# Patient Record
Sex: FEMALE | Race: WHITE | NOT HISPANIC OR LATINO | Employment: PART TIME | RURAL
[De-identification: names, ages, dates, MRNs, and addresses within clinical notes are randomized per-mention and may not be internally consistent; named-entity substitution may affect disease eponyms.]

---

## 2020-07-28 ENCOUNTER — OFFICE VISIT (OUTPATIENT)
Dept: FAMILY MEDICINE CLINIC | Facility: CLINIC | Age: 20
End: 2020-07-28

## 2020-07-28 VITALS
OXYGEN SATURATION: 98 % | DIASTOLIC BLOOD PRESSURE: 72 MMHG | TEMPERATURE: 98.5 F | BODY MASS INDEX: 28.52 KG/M2 | RESPIRATION RATE: 16 BRPM | WEIGHT: 171.2 LBS | HEART RATE: 104 BPM | HEIGHT: 65 IN | SYSTOLIC BLOOD PRESSURE: 118 MMHG

## 2020-07-28 DIAGNOSIS — F41.9 ANXIETY: ICD-10-CM

## 2020-07-28 DIAGNOSIS — Z76.89 ENCOUNTER TO ESTABLISH CARE: ICD-10-CM

## 2020-07-28 DIAGNOSIS — F32.A DEPRESSION, UNSPECIFIED DEPRESSION TYPE: ICD-10-CM

## 2020-07-28 PROBLEM — Z00.00 WELLNESS EXAMINATION: Status: ACTIVE | Noted: 2020-07-28

## 2020-07-28 PROCEDURE — 99203 OFFICE O/P NEW LOW 30 MIN: CPT | Performed by: FAMILY MEDICINE

## 2020-07-28 RX ORDER — SERTRALINE HYDROCHLORIDE 100 MG/1
100 TABLET, FILM COATED ORAL DAILY
COMMUNITY
End: 2020-07-28 | Stop reason: SDUPTHER

## 2020-07-28 RX ORDER — SERTRALINE HYDROCHLORIDE 100 MG/1
100 TABLET, FILM COATED ORAL DAILY
Qty: 30 TABLET | Refills: 3 | Status: SHIPPED | OUTPATIENT
Start: 2020-07-28 | End: 2020-09-28 | Stop reason: SDUPTHER

## 2020-07-28 RX ORDER — FEXOFENADINE HCL 180 MG/1
180 TABLET ORAL DAILY
COMMUNITY

## 2020-07-28 NOTE — PROGRESS NOTES
Subjective   Luci Cabral is a 19 y.o. female presenting with chief complaint of:   Chief Complaint   Patient presents with   • Establish Care       History of Present Illness :  Alone.  Here for primarily to establish care..       Has  few chronic problems to consider that might have a bearing on today's issues;  an interval appointment.       Chronic/acute problems reviewed today:   1. Anxiety chronic/variable.  Goes back several years; into her childhood.  Early on had some issues of depression enough to be suicidal without injury.  Has done well with Zoloft; having used other medicines in the past.  Moved to [facility] and has not been able to find a physician psychiatrist or even a counselor.  Getting ready to run out of her prescription and needs help.  Social stressors are usual and not extreme.  She moved here to be closer to her friend.  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   2. Depression, unspecified depression type see anxiety.  Denies suicidal ideations.   3. Encounter to establish care first visit.     Has an/another acute issue today: none.    The following portions of the patient's history were reviewed and updated as appropriate: allergies, current medications, past family history, past medical history, past social history, past surgical history and problem list.      Current Outpatient Medications:   •  fexofenadine (ALLEGRA) 180 MG tablet, Take 180 mg by mouth Daily., Disp: , Rfl:   •  sertraline (ZOLOFT) 100 MG tablet, Take 1 tablet by mouth Daily., Disp: 30 tablet, Rfl: 3    No problems with medications.  Refills if needed done    Allergies   Allergen Reactions   • Penicillins Unknown - High Severity   • Amoxicillin Unknown - High Severity   • Omnicef [Cefdinir] Unknown - High Severity       Review of Systems  GENERAL:  Active home/work. Sleep is ok. No fever now/recent.  SKIN: No rash/skin lesion of concern other than that above  ENDO:  No syncope, near or diaphoretic  "sweaty spells.  HEENT: No recent head injury; or problem with headache.  No vision change.  No significant hearing loss.  Ears without pain/drainage.  No sore throat.  No significant nasal/sinus congestion/drainage. No epistaxis.  CHEST: No chest wall tenderness or mass. No significant cough, without wheeze.  No SOB; no hemoptysis.  CV: No exertional chest pain, palpitations, ankle edema.  GI: No dysphagia or heartburn.  No abdominal pain, diarrhea, constipation.  No rectal bleeding, or melena.    :  Voids without dysuria; incontinence to completion.  GYN: Menses normal.   ORTHO: No painful/swollen joints but various on /off sore.  No sore neck or back.  No acute neck or back pain without recent injury.  NEURO: No focal/significant weakness of extremities. No dizziness.   No numbness/paresthesias.   PSYCH: No memory loss.  Mood /variable; occ anxious, depressed but/and not suicidal.  Tries to tolerate stress .   Screening:  Mammogram: NA  Bone density: NA  Low dose CT chest: NA  GI: NA  Prostate: NA  Usual lab order; NA    Lab Results:  No results found for this or any previous visit.    A1C:No results for input(s): HGBA1C in the last 33301 hours.  PSA:No results for input(s): PSA in the last 74970 hours.  CBC:No results for input(s): WBC, HGB, HCT, PLT, IRONSERUM, IRON in the last 44492 hours.   BMP/CMP:No results for input(s): NA, K, CL, CO2, GLU, BUN, CREATININE, EGFRIFNONA, EGFRIFAFRI, CALCIUM in the last 78032 hours.  HEPATIC:No results for input(s): ALT, AST, ALKPHOS, TOTAL in the last 61467 hours.    Invalid input(s): HEPATITSC  THYROID:No results for input(s): TSH, T3FREE, FTI in the last 97953 hours.    Invalid input(s): T4FREE, T3, T4, TEUP,  TOTALT4    Objective   /72   Pulse 104   Temp 98.5 °F (36.9 °C)   Resp 16   Ht 165.1 cm (65\")   Wt 77.7 kg (171 lb 3.2 oz)   SpO2 98%   Breastfeeding No   BMI 28.49 kg/m²   Body mass index is 28.49 kg/m².    Recent Vitals       7/28/2020             " BP:  118/72    Pulse:  104    Temp:  98.5 °F (36.9 °C)    Weight:  77.7 kg (171 lb 3.2 oz)    Percentile: 92 %, Z= 1.41*    BMI (Calculated):  28.5    *Growth percentiles are based on [facility] (Girls, 2-20 Years) data          Physical Exam  GENERAL:  Well nourished/developed in no acute distress.   SKIN: Turgor excellent, without wound, rash, lesion.  HEENT: Normal cephalic without trauma.  Pupils equal round reactive to light. Extraocular motions full without nystagmus.   External canals nonobstructive nontender without reddness. Tymphatic membranes catherine with lior structures intact.   Oral cavity without growths, exudates, and moist.  Posterior pharynx without mass, obstruction, redness.  No thyromegaly, mass, tenderness, lymphadenopathy and supple.  CV: Regular rhythm.  No murmur, gallop,  edema. Posterior pulses intact.  No carotid bruits.  CHEST: No chest wall tenderness or mass.   LUNGS: Symmetric motion with clear to auscultation.  No dullness to percussion  ABD: Soft, nontender without mass.   ORTHO: Symmetric extremities without swelling/point tenderness.  Full gross range of motion.  .  Wheelchair .  Walking with assistance .  NEURO: CN 2-12 grossly intact.  Symmetric facies and UE/LE. 3/5 strength throughout. 1/4 x UE/LE  equal reflexes.  Nonfocal use extremities. Speech clear.  Intact light touch with monofilament, vibratory sensation with tuning fork; equal toes/distal feet.    PSYCH: Oriented x 3.  Pleasant calm, well kept.  Purposeful/directed conservation with intact short/long gross memory.     Assessment/Plan     1. Anxiety    2. Depression, unspecified depression type    3. Encounter to establish care        Discussions:   Try to help get her a counselor    Rx: reviewed/changes:  New Medications Ordered This Visit   Medications   • sertraline (ZOLOFT) 100 MG tablet     Sig: Take 1 tablet by mouth Daily.     Dispense:  30 tablet     Refill:  3       LAB/Testing/Referrals: reviewed/orders:   Today:    Orders Placed This Encounter   Procedures   • Ambulatory Referral to Behavioral Health     Chronic/recurrent labs above or change to:   later     Body mass index is 28.49 kg/m².  Patient's Body mass index is 28.49 kg/m². BMI is within normal parameters. No follow-up required..    Tobacco use reviewed:    Non-smoker  Luci Cabral  reports that she has never smoked. She has never used smokeless tobacco.     There are no Patient Instructions on file for this visit.    Follow up: Return for Dr Tenorio-, 3-6 m;.  Future Appointments   Date Time Provider Department Center   12/1/2020  1:30 PM Jason Tenorio MD [facility] None

## 2020-09-28 ENCOUNTER — TELEPHONE (OUTPATIENT)
Dept: FAMILY MEDICINE CLINIC | Facility: CLINIC | Age: 20
End: 2020-09-28

## 2020-09-28 DIAGNOSIS — F41.9 ANXIETY: ICD-10-CM

## 2020-09-28 DIAGNOSIS — F32.A DEPRESSION, UNSPECIFIED DEPRESSION TYPE: ICD-10-CM

## 2020-09-28 RX ORDER — SERTRALINE HYDROCHLORIDE 100 MG/1
100 TABLET, FILM COATED ORAL DAILY
Qty: 30 TABLET | Refills: 3 | Status: SHIPPED | OUTPATIENT
Start: 2020-09-28 | End: 2020-10-01 | Stop reason: SDUPTHER

## 2020-09-28 NOTE — TELEPHONE ENCOUNTER
Caller: Luci Cabral    Relationship: Self    Best call back number: [phone]    What medication are you requesting:sertraline (ZOLOFT) 150 MG tablet    What are your current symptoms: ANXIETY AND DEPRESSION      Have you had these symptoms before:    [x] Yes  [] No    Have you been treated for these symptoms before:   [x] Yes  [] No    If a prescription is needed, what is your preferred pharmacy and phone number: [address] [facility] [phone] PH - [phone]      Additional notes:    PATIENT STATES THAT SHE WAS NOT HAPPY WITH THE OUTCOME OF TAKING  MG DOSAGE SO INCREASED TO TAKING 150 MG DAILY AND IS FEELING MUCH BETTER AND WOULD LIKE TO HAVE AN INCREASE IN MEDICATION SENT TO PHARMACY

## 2020-09-28 NOTE — TELEPHONE ENCOUNTER
I called pt to let her know she will need an appt if she wants it increased to 150mg. I went ahead and sent in a refill of the 100mg.

## 2020-10-01 ENCOUNTER — OFFICE VISIT (OUTPATIENT)
Dept: FAMILY MEDICINE CLINIC | Facility: CLINIC | Age: 20
End: 2020-10-01

## 2020-10-01 VITALS
BODY MASS INDEX: 28.66 KG/M2 | OXYGEN SATURATION: 100 % | HEART RATE: 74 BPM | RESPIRATION RATE: 16 BRPM | TEMPERATURE: 96.9 F | WEIGHT: 172 LBS | SYSTOLIC BLOOD PRESSURE: 118 MMHG | DIASTOLIC BLOOD PRESSURE: 76 MMHG | HEIGHT: 65 IN

## 2020-10-01 DIAGNOSIS — F32.A DEPRESSION, UNSPECIFIED DEPRESSION TYPE: ICD-10-CM

## 2020-10-01 DIAGNOSIS — F41.9 ANXIETY: ICD-10-CM

## 2020-10-01 PROCEDURE — 99213 OFFICE O/P EST LOW 20 MIN: CPT | Performed by: FAMILY MEDICINE

## 2020-10-01 RX ORDER — SERTRALINE HYDROCHLORIDE 100 MG/1
150 TABLET, FILM COATED ORAL DAILY
Qty: 45 TABLET | Refills: 3 | Status: SHIPPED | OUTPATIENT
Start: 2020-10-01 | End: 2021-03-10 | Stop reason: SDUPTHER

## 2020-10-01 NOTE — PROGRESS NOTES
Subjective   Luci Cabral is a 19 y.o. female presenting with chief complaint of:   Chief Complaint   Patient presents with   • Medication Problem     requests increase of zoloft       History of Present Illness :  Alone.  Here for primarily an acute issue today; can she increase her Zoloft.  Recently started Zoloft; medicine that she is used in the past.  She definitely can find that the 100 mg has made her less anxious depressed; but she thinks she could tolerate 150 as she has no side effects.  No significant stressors but many minor stressors keep her easily down or anxious.  No suicidal ideation or intent.       Has multiple chronic problems to consider that might have a bearing on today's issues; somewhat an interval appointment.       Chronic/acute problems reviewed today:   1. Depression, unspecified depression type chronic currently stable/not significant  mood swings, down moods, nervousness, difficulty with concentration to function home/work.  Others close have not been concerned.  No suicide ideation/intent.  Rx helps      2. Anxiety Chronic/variable; but improving.   On/off anxiety tolerated somewhat.  Rx helps and interested in Rx change. Stress ongoing stable.      3       Vaccine need/counseling.     Has an/another acute issue today: none.    The following portions of the patient's history were reviewed and updated as appropriate: allergies, current medications, past family history, past medical history, past social history, past surgical history and problem list.      Current Outpatient Medications:   •  fexofenadine (ALLEGRA) 180 MG tablet, Take 180 mg by mouth Daily., Disp: , Rfl:   •  sertraline (ZOLOFT) 100 MG tablet, Take 1 tablet by mouth Daily., Disp: 30 tablet, Rfl: 3    No problems with medications.  Refills if needed done    Allergies   Allergen Reactions   • Penicillins Unknown - High Severity   • Amoxicillin Unknown - High Severity   • Omnicef [Cefdinir] Unknown - High Severity  "      Review of Systems  GENERAL:  Active home/work. Sleep is ok. No fever now/recent.  ENDO:  No syncope, near or diaphoretic sweaty spells.  HEENT: No recent head injury; or problem with headache.   CHEST: No chest wall tenderness or mass. No significant cough, without wheeze.  No SOB; no hemoptysis.  CV: No exertional chest pain, palpitations, ankle edema.  GI: No dysphagia or heartburn.  No abdominal pain, diarrhea, constipation.    :  Voids without dysuria; incontinence to completion.  GYN: Menses normal.   ORTHO: No painful/swollen joints but various on /off sore.  No sore neck or back.  No acute neck or back pain without recent injury.  NEURO: No focal/significant weakness of extremities. No dizziness.   No numbness/paresthesias.   PSYCH: No memory loss.  Mood /variable; occ anxious, depressed but/and not suicidal.  Tries to tolerate stress .   Screening:  Mammogram: NA  Bone density: NA  Low dose CT chest: NA  GI: NA  Prostate: NA  Usual lab order; NA      Lab Results:  None    Objective   /76   Pulse 74   Temp 96.9 °F (36.1 °C)   Resp 16   Ht 165.1 cm (65\")   Wt 78 kg (172 lb)   SpO2 100%   BMI 28.62 kg/m²   Body mass index is 28.62 kg/m².    Recent Vitals       7/28/2020 10/1/2020          BP:  118/72  118/76      Pulse:  104  74      Temp:  98.5 °F (36.9 °C)  96.9 °F (36.1 °C)      Weight:  77.7 kg (171 lb 3.2 oz)  78 kg (172 lb)      Percentile: 92 %, Z= 1.41* 92 %, Z= 1.42*      BMI (Calculated):  28.5  28.6      *Growth percentiles are based on CDC (Girls, 2-20 Years) data          Physical Exam  GENERAL:  Well nourished/developed in no acute distress.   HEENT: Normal cephalic without trauma.  Pupils equal round reactive to light. Extraocular motions full without nystagmus.    No thyromegaly, mass, tenderness, lymphadenopathy and supple.  CV: Regular rhythm.  No murmur, gallop,  edema.  CHEST: No chest wall tenderness or mass.   LUNGS: Symmetric motion with clear to auscultation.    ABD: " Soft, nontender without mass.   ORTHO: Symmetric extremities without swelling/point tenderness.  Full gross range of motion.  .  Wheelchair .  Walking with assistance .  NEURO: CN 2-12 grossly intact.  Symmetric facies and UE/LE. 3/5 strength throughout. 1/4 x UE/LE  equal reflexes.  Nonfocal use extremities. Speech clear.  Intact light touch with monofilament, vibratory sensation with tuning fork; equal toes/distal feet.    PSYCH: Oriented x 3.  Pleasant calm, well kept.  Purposeful/directed conservation with intact short/long gross memory.     Assessment/Plan     1. Depression, unspecified depression type    2. Anxiety      Discussions:   Flu mist; an option but carries risk for a live virus of spread  No contraindications for regular flu shot  Increase zoloft; report if problems  Labs would be nice; yearly    Rx: reviewed/changes:  New Medications Ordered This Visit   Medications   • sertraline (ZOLOFT) 100 MG tablet     Sig: Take 1.5 tablets by mouth Daily.     Dispense:  45 tablet     Refill:  3     LAB/Testing/Referrals: reviewed/orders:   Today:   No orders of the defined types were placed in this encounter.    Chronic/recurrent labs above or change to:   same     Body mass index is 28.62 kg/m².  Patient's Body mass index is 28.62 kg/m². BMI is within normal parameters. No follow-up required..      Tobacco use reviewed:    Luci Cabral  reports that she has never smoked. She has never used smokeless tobacco..  There are no Patient Instructions on file for this visit.    Follow up: Return for move 12.1.20 back 6m.  Future Appointments   Date Time Provider Department Center   3/25/2021 10:45 AM Jason Tenorio MD [facility] None       Procedures none

## 2020-12-29 ENCOUNTER — OFFICE VISIT (OUTPATIENT)
Dept: FAMILY MEDICINE CLINIC | Facility: CLINIC | Age: 20
End: 2020-12-29

## 2020-12-29 VITALS
TEMPERATURE: 98.4 F | HEART RATE: 92 BPM | BODY MASS INDEX: 28.82 KG/M2 | WEIGHT: 173 LBS | DIASTOLIC BLOOD PRESSURE: 74 MMHG | SYSTOLIC BLOOD PRESSURE: 116 MMHG | HEIGHT: 65 IN | OXYGEN SATURATION: 99 %

## 2020-12-29 DIAGNOSIS — J45.20 MILD INTERMITTENT ASTHMA WITHOUT COMPLICATION: Primary | ICD-10-CM

## 2020-12-29 PROCEDURE — 99213 OFFICE O/P EST LOW 20 MIN: CPT | Performed by: NURSE PRACTITIONER

## 2020-12-29 RX ORDER — ALBUTEROL SULFATE 90 UG/1
2 AEROSOL, METERED RESPIRATORY (INHALATION) EVERY 4 HOURS PRN
Qty: 18 G | Refills: 2 | Status: SHIPPED | OUTPATIENT
Start: 2020-12-29 | End: 2021-03-25

## 2020-12-29 NOTE — PROGRESS NOTES
Subjective   Chief Complaint:  Asthma    History of Present Illness:  This 20 y.o. female was seen in the office today.  She is evaluated for asthma.  She reports she had childhood asthma.  She has gotten back into the gym and is having some exercise-induced episodes.  Denies any nighttime awakenings.  Denies any chest pains with exercise.  Reports having some airway tightness.    Allergies   Allergen Reactions   • Penicillins Unknown - High Severity   • Amoxicillin Unknown - High Severity   • Omnicef [Cefdinir] Unknown - High Severity      Current Outpatient Medications on File Prior to Visit   Medication Sig   • fexofenadine (ALLEGRA) 180 MG tablet Take 180 mg by mouth Daily.   • sertraline (ZOLOFT) 100 MG tablet Take 1.5 tablets by mouth Daily.     No current facility-administered medications on file prior to visit.       Past Medical, Surgical, Social, and Family History:  Past Medical History:   Diagnosis Date   • Depression      Past Surgical History:   Procedure Laterality Date   • APPENDECTOMY       Social History     Socioeconomic History   • Marital status: Single     Spouse name: Not on file   • Number of children: Not on file   • Years of education: Not on file   • Highest education level: Not on file   Tobacco Use   • Smoking status: Never Smoker   • Smokeless tobacco: Never Used   Substance and Sexual Activity   • Alcohol use: Never     Frequency: Never   • Drug use: Never   • Sexual activity: Defer     Family History   Problem Relation Age of Onset   • Crohn's disease Mother      Review of Systems   Constitutional: Negative for appetite change, chills and fever.   Respiratory: Negative for cough and shortness of breath.    Cardiovascular: Negative for chest pain and palpitations.   Musculoskeletal: Negative for arthralgias and myalgias.     Objective   Physical Exam  Vitals signs and nursing note reviewed.   Constitutional:       General: She is not in acute distress.     Appearance: She is not  "diaphoretic.   HENT:      Head: Normocephalic and atraumatic.      Nose: Nose normal.   Eyes:      Conjunctiva/sclera: Conjunctivae normal.      Pupils: Pupils are equal, round, and reactive to light.   Neck:      Musculoskeletal: Normal range of motion and neck supple.      Thyroid: No thyromegaly.      Vascular: No JVD.      Trachea: No tracheal deviation.   Cardiovascular:      Rate and Rhythm: Normal rate and regular rhythm.      Heart sounds: Normal heart sounds. No murmur. No friction rub. No gallop.    Pulmonary:      Effort: Pulmonary effort is normal. No respiratory distress.      Breath sounds: Normal breath sounds. No wheezing.   Abdominal:      General: Bowel sounds are normal.      Palpations: Abdomen is soft.      Tenderness: There is no abdominal tenderness. There is no guarding.   Musculoskeletal: Normal range of motion.         General: No tenderness or deformity.   Lymphadenopathy:      Cervical: No cervical adenopathy.   Skin:     General: Skin is warm and dry.      Capillary Refill: Capillary refill takes less than 2 seconds.   Neurological:      Mental Status: She is alert and oriented to person, place, and time.   Psychiatric:         Behavior: Behavior normal.         Thought Content: Thought content normal.         Judgment: Judgment normal.     /74 (BP Location: Left arm)   Pulse 92   Temp 98.4 °F (36.9 °C)   Ht 165.1 cm (65\")   Wt 78.5 kg (173 lb)   SpO2 99%   BMI 28.79 kg/m²     Assessment/Plan   Diagnoses and all orders for this visit:    1. Mild intermittent asthma without complication (Primary)  -     albuterol sulfate  (90 Base) MCG/ACT inhaler; Inhale 2 puffs Every 4 (Four) Hours As Needed for Wheezing.  Dispense: 18 g; Refill: 2    Discussion:  Advised and educated plan of care.  Advised how a great asthma.  She is currently mild intermittent asthma without any complications.  Advised step 1 approach Ca and let me know if ineffective.  Advised how to premedicate " before exercise.  Follow-up as needed.  Advised to keep a follow-up every year if we continue to refills.    Follow-up:  Return if symptoms worsen or fail to improve.    Note e-Signed by CAREN Caruso on 12/29/2020 at 11:49 CST

## 2021-03-10 DIAGNOSIS — F32.A DEPRESSION, UNSPECIFIED DEPRESSION TYPE: ICD-10-CM

## 2021-03-10 DIAGNOSIS — F41.9 ANXIETY: ICD-10-CM

## 2021-03-10 RX ORDER — SERTRALINE HYDROCHLORIDE 100 MG/1
150 TABLET, FILM COATED ORAL DAILY
Qty: 45 TABLET | Refills: 3 | Status: SHIPPED | OUTPATIENT
Start: 2021-03-10

## 2021-03-10 NOTE — TELEPHONE ENCOUNTER
Requested Prescriptions     Pending Prescriptions Disp Refills   • sertraline (ZOLOFT) 100 MG tablet 45 tablet 3     Sig: Take 1.5 tablets by mouth Daily.

## 2021-03-10 NOTE — TELEPHONE ENCOUNTER
Caller: Luci Cabral    Relationship: Self    Best call back number: [phone]    Medication needed:   Requested Prescriptions     Pending Prescriptions Disp Refills   • sertraline (ZOLOFT) 100 MG tablet 45 tablet 3     Sig: Take 1.5 tablets by mouth Daily.       When do you need the refill by: ASAP        Does the patient have less than a 3 day supply:  [x] Yes  [] No    What is the patient's preferred pharmacy: [facility] - [address] [phone] [place] [phone]

## 2021-03-25 ENCOUNTER — OFFICE VISIT (OUTPATIENT)
Dept: FAMILY MEDICINE CLINIC | Facility: CLINIC | Age: 21
End: 2021-03-25

## 2021-03-25 VITALS
WEIGHT: 173 LBS | RESPIRATION RATE: 18 BRPM | SYSTOLIC BLOOD PRESSURE: 124 MMHG | OXYGEN SATURATION: 99 % | BODY MASS INDEX: 28.82 KG/M2 | DIASTOLIC BLOOD PRESSURE: 70 MMHG | TEMPERATURE: 98.7 F | HEART RATE: 112 BPM | HEIGHT: 65 IN

## 2021-03-25 DIAGNOSIS — F32.A DEPRESSION, UNSPECIFIED DEPRESSION TYPE: ICD-10-CM

## 2021-03-25 DIAGNOSIS — R06.02 SHORTNESS OF BREATH: ICD-10-CM

## 2021-03-25 DIAGNOSIS — F41.9 ANXIETY: ICD-10-CM

## 2021-03-25 DIAGNOSIS — Z87.09 HISTORY OF ASTHMA: ICD-10-CM

## 2021-03-25 DIAGNOSIS — R05.9 COUGH: ICD-10-CM

## 2021-03-25 DIAGNOSIS — S01.302A OPEN WOUND OF LEFT EAR, UNSPECIFIED OPEN WOUND TYPE, INITIAL ENCOUNTER: ICD-10-CM

## 2021-03-25 DIAGNOSIS — J45.909 ASTHMA, UNSPECIFIED ASTHMA SEVERITY, UNSPECIFIED WHETHER COMPLICATED, UNSPECIFIED WHETHER PERSISTENT: ICD-10-CM

## 2021-03-25 PROBLEM — J45.20 MILD INTERMITTENT ASTHMA WITHOUT COMPLICATION: Status: ACTIVE | Noted: 2021-03-25

## 2021-03-25 PROCEDURE — 99213 OFFICE O/P EST LOW 20 MIN: CPT | Performed by: FAMILY MEDICINE

## 2021-03-25 RX ORDER — MUPIROCIN CALCIUM 20 MG/G
CREAM TOPICAL 3 TIMES DAILY
Qty: 15 G | Refills: 0 | Status: SHIPPED | OUTPATIENT
Start: 2021-03-25

## 2021-03-28 NOTE — PROGRESS NOTES
Subjective   Luci Cabral is a 20 y.o. female presenting with chief complaint of:   Chief Complaint   Patient presents with   • Asthma     pt states she was seen in december and it hasnt gotten better.        History of Present Illness :  Alone.   Last visit     Has multiple chronic problems to consider that might have a bearing on today's issues;  an interval appointment.       Chronic/acute problems reviewed today:   1. History of asthma: chronic/stable infrequent cough/wheeze; recently we gave prn albuterol.  Cannot tell it has helped.   Hx asthma as child.  Can not recall if exercise involved.    Maintance Rx not addressed.       2. Asthma, unspecified asthma severity, unspecified whether complicated, unspecified whether persistent: same: same off cough, feeling mild SOB.     3. Shortness of breath: Chronic/stable:  On/off occ feeling mild SOB.  Contributing diagnosis: felt to be asthma.  No chest pain. No reflux.      4. Cough: chronic/variable occ cough.: same   5. Open wound of left ear, unspecified open wound type, initial encounter: acute soreness, drainage of R ear lobe; upper segment ear ring puncture site.    6.      Anxiety/Depression: better with Rx.  No obvious side effects.     Has an/another acute issue today: none.    The following portions of the patient's history were reviewed and updated as appropriate: allergies, current medications, past family history, past medical history, past social history, past surgical history and problem list.    Current Outpatient Medications:   •  fexofenadine (ALLEGRA) 180 MG tablet, Take 180 mg by mouth Daily., Disp: , Rfl:   •  sertraline (ZOLOFT) 100 MG tablet, Take 1.5 tablets by mouth Daily., Disp: 45 tablet, Rfl: 3    No problems with medications.    Allergies   Allergen Reactions   • Penicillins Unknown - High Severity   • Amoxicillin Unknown - High Severity   • Omnicef [Cefdinir] Unknown - High Severity       Review of Systems   Constitutional: Negative for  "activity change, appetite change, chills, fatigue and fever.   HENT: Negative for congestion, postnasal drip, rhinorrhea, sinus pressure and sore throat.    Respiratory: Positive for cough. Negative for choking, shortness of breath and wheezing.    Cardiovascular: Negative for chest pain, palpitations and leg swelling.   Gastrointestinal: Negative for abdominal pain, diarrhea, nausea and vomiting.   Skin: Positive for wound.   Neurological: Negative for weakness and headaches.   Psychiatric/Behavioral: Negative for dysphoric mood and sleep disturbance. The patient is not nervous/anxious.      Lab Results:  No results found for this or any previous visit.    A1C:No results for input(s): HGBA1C in the last 67099 hours.  LIPID:No results for input(s): CHLPL, LDL, HDL, TRIG in the last 47906 hours.  PSA:No results for input(s): PSA in the last 20472 hours.  CBC:No results for input(s): WBC, HGB, HCT, PLT, IRONSERUM, IRON in the last 73243 hours.   BMP/CMP:No results for input(s): NA, K, CL, CO2, GLU, BUN, CREATININE, EGFRIFNONA, EGFRIFAFRI, CALCIUM in the last 84013 hours.  HEPATIC:No results for input(s): ALT, AST, ALKPHOS, TOTAL in the last 34822 hours.    Invalid input(s): HEPATITSC  THYROID:No results for input(s): TSH, FREET4, FTI in the last 21710 hours.    Invalid input(s): FREET3, T3, T4, TEUP,  TOTALT4    Objective   /70   Pulse 112   Temp 98.7 °F (37.1 °C) (Infrared)   Resp 18   Ht 165.1 cm (65\")   Wt 78.5 kg (173 lb)   LMP 02/25/2021   SpO2 99%   BMI 28.79 kg/m²   Body mass index is 28.79 kg/m².    Recent Vitals       10/1/2020 12/29/2020 3/25/2021       BP:  118/76  116/74  124/70     Pulse:  74  92  112     Temp:  96.9 °F (36.1 °C)  98.4 °F (36.9 °C)  98.7 °F (37.1 °C)     Weight:  78 kg (172 lb)  78.5 kg (173 lb)  78.5 kg (173 lb)     Percentile: 92 %, Z= 1.42*       BMI (Calculated):  28.6  28.8  28.8     *Growth percentiles are based on CDC (Girls, 2-20 Years) data          Physical " Exam  Vitals reviewed.   Constitutional:       General: She is not in acute distress.     Appearance: Normal appearance. She is not ill-appearing.   HENT:      Right Ear: Tympanic membrane and ear canal normal.      Left Ear: Tympanic membrane and ear canal normal.      Ears:      Comments: PHOTO L pinna; no induration/significant pain     Mouth/Throat:      Mouth: Mucous membranes are moist.   Eyes:      Conjunctiva/sclera: Conjunctivae normal.      Pupils: Pupils are equal, round, and reactive to light.   Cardiovascular:      Rate and Rhythm: Normal rate and regular rhythm.      Heart sounds: Normal heart sounds.   Pulmonary:      Effort: Pulmonary effort is normal.      Breath sounds: Normal breath sounds.   Musculoskeletal:      Cervical back: Neck supple.   Neurological:      Mental Status: She is alert.   Psychiatric:         Mood and Affect: Mood normal.         Behavior: Behavior normal.         Thought Content: Thought content normal.         Judgment: Judgment normal.       Assessment/Plan     1. Asthma, unspecified asthma severity, unspecified whether complicated, unspecified whether persistent    2. Shortness of breath    3. Cough    4. History of asthma    5. Open wound of left ear, unspecified open wound type, initial encounter    6. Anxiety    7. Depression, unspecified depression type      Discussion:  Glad zoloft is helping  Try bactroban first  Try pulmicort daily; to see if overall feels better  More testing later?    Medical decision issues:   Data review above:   Rx: reviewed and decisions:     New Medications Ordered This Visit   Medications   • budesonide (PULMICORT) 180 MCG/ACT inhaler     Sig: Inhale 1 puff 2 (Two) Times a Day.     Dispense:  1 each     Refill:  1   • mupirocin (Bactroban) 2 % cream     Sig: Apply  topically to the appropriate area as directed 3 (Three) Times a Day.     Dispense:  15 g     Refill:  0     Orders placed:   LAB/Testing/Referrals: reviewed/orders:   Today:   No  orders of the defined types were placed in this encounter.    Chronic/recurrent labs above or change to:   same     Health maintenance:   Body mass index is 28.79 kg/m².  Patient's Body mass index is 28.79 kg/m². BMI is within normal parameters. No follow-up required..    Tobacco use reviewed:    Luci Cabral  reports that she has never smoked. She has never used smokeless tobacco..   There are no Patient Instructions on file for this visit.    Follow up: Return for Dr Tenorio-4-6w.  No future appointments.